# Patient Record
Sex: MALE | Race: WHITE
[De-identification: names, ages, dates, MRNs, and addresses within clinical notes are randomized per-mention and may not be internally consistent; named-entity substitution may affect disease eponyms.]

---

## 2020-01-31 ENCOUNTER — HOSPITAL ENCOUNTER (EMERGENCY)
Dept: HOSPITAL 41 - JD.ED | Age: 50
Discharge: HOME | End: 2020-01-31
Payer: COMMERCIAL

## 2020-01-31 DIAGNOSIS — I10: ICD-10-CM

## 2020-01-31 DIAGNOSIS — R53.83: Primary | ICD-10-CM

## 2020-01-31 DIAGNOSIS — Z23: ICD-10-CM

## 2020-01-31 DIAGNOSIS — Z79.899: ICD-10-CM

## 2020-01-31 PROCEDURE — G0008 ADMIN INFLUENZA VIRUS VAC: HCPCS

## 2020-01-31 NOTE — EDM.PDOC
ED HPI GENERAL MEDICAL PROBLEM





- General


Chief Complaint: Cardiovascular Problem


Stated Complaint: high blood pressure dizzy and run down


Time Seen by Provider: 01/31/20 19:26





- History of Present Illness


INITIAL COMMENTS - FREE TEXT/NARRATIVE: 





49-year-old male presents the emergency room with fatigue.





This is been an ongoing problem getting worse over the last several weeks.  

Patient is treated for hypertension and has a hip that needs to be looked at by 

orthopedics otherwise she seems to be doing all right.  The patient is very 

tired at the end of the day and just does not have any energy he is tried to 

work out a couple of times a week but at the end of his works out he is very 

short of breath and not able to do anything.  He does not have a cough he has 

no chest pain chest pressure.  He has had no leg swelling.








  ** Headache


Pain Score (Numeric/FACES): 1





- Related Data


 Allergies











Allergy/AdvReac Type Severity Reaction Status Date / Time


 


No Known Allergies Allergy   Verified 01/31/20 19:19











Home Meds: 


 Home Meds





Lisdexamfetamine Dimesylate [Vyvanse] 70 mg PO DAILY 01/31/20 [History]


Simvastatin 40 mg PO DAILY 01/31/20 [History]


amLODIPine Besylate [Amlodipine Besylate] 5 mg PO DAILY 01/31/20 [History]


buPROPion HCl [Wellbutrin Xl] 300 mg PO DAILY 01/31/20 [History]


hydrOXYzine HCL [Hydroxyzine HCl] 25 mg PO BEDTIME PRN 01/31/20 [History]


hydroCHLOROthiazide [Hydrochlorothiazide] 12.5 mg PO DAILY 01/31/20 [History]


traZODone HCl [Trazodone HCl] 50 mg PO BEDTIME PRN 01/31/20 [History]











ED ROS GENERAL





- Review of Systems


Review Of Systems: See Below


Constitutional: Reports: No Symptoms.  Denies: Fever, Chills


HEENT: Reports: No Symptoms


Respiratory: Reports: No Symptoms


Cardiovascular: Reports: No Symptoms.  Denies: Palpitations


Endocrine: Reports: Fatigue.  Denies: No Symptoms


GI/Abdominal: Reports: No Symptoms





ED EXAM, GENERAL





- Physical Exam


Exam: See Below


Exam Limited By: No Limitations


General Appearance: Alert, No Apparent Distress


Head: Atraumatic, Normocephalic


Neck: Normal Inspection, Supple, Non-Tender, Full Range of Motion.  No: 

Lymphadenopathy (L), Lymphadenopathy (R), Tender Midline, Thyromegaly


Respiratory/Chest: No Respiratory Distress, Lungs Clear, Normal Breath Sounds


Cardiovascular: Normal Peripheral Pulses, Regular Rate, Rhythm, No Edema, No 

Murmur


Neurological: Alert, Oriented, Normal Cognition





EKG INTERPRETATION


EKG Date: 01/31/20


Rhythm: NSR


Axis: Normal


P-Wave: Present


QRS: Normal


ST-T: Normal


QT: Normal


Comparison: NA - No Prior EKG





Course





- Vital Signs


Last Recorded V/S: 


 Last Vital Signs











Temp  36.7 C   01/31/20 19:14


 


Pulse  76   01/31/20 19:14


 


Resp  15   01/31/20 19:14


 


BP  128/104 H  01/31/20 19:14


 


Pulse Ox  98   01/31/20 19:14














- Orders/Labs/Meds


Orders: 


 Active Orders 24 hr











 Category Date Time Status


 


 EKG 12 Lead [EKG Documentation Completion] [RC] STAT Care  01/31/20 19:36 

Active


 


 Influenza Vaccine Charge [RC] .DISCHARGE Care  01/31/20 19:25 Active











Labs: 


 Laboratory Tests











  01/31/20 01/31/20 01/31/20 Range/Units





  19:47 19:47 19:47 


 


WBC  7.33    (4.23-9.07)  K/mm3


 


RBC  4.93    (4.63-6.08)  M/mm3


 


Hgb  14.6    (13.7-17.5)  gm/dl


 


Hct  41.6    (40.1-51.0)  %


 


MCV  84.4    (79.0-92.2)  fl


 


MCH  29.6    (25.7-32.2)  pg


 


MCHC  35.1    (32.2-35.5)  g/dl


 


RDW Std Deviation  41.3    (35.1-43.9)  fL


 


Plt Count  350 H    (163-337)  K/mm3


 


MPV  8.9 L    (9.4-12.3)  fl


 


Neut % (Auto)  51.3    (34.0-67.9)  %


 


Lymph % (Auto)  35.1    (21.8-53.1)  %


 


Mono % (Auto)  9.7    (5.3-12.2)  %


 


Eos % (Auto)  3.3    (0.8-7.0)  


 


Baso % (Auto)  0.5    (0.1-1.2)  %


 


Neut # (Auto)  3.76    (1.78-5.38)  K/mm3


 


Lymph # (Auto)  2.57    (1.32-3.57)  K/mm3


 


Mono # (Auto)  0.71    (0.30-0.82)  K/mm3


 


Eos # (Auto)  0.24    (0.04-0.54)  K/mm3


 


Baso # (Auto)  0.04    (0.01-0.08)  K/mm3


 


Sodium   139   (136-145)  mEq/L


 


Potassium   4.1   (3.5-5.1)  mEq/L


 


Chloride   102   ()  mEq/L


 


Carbon Dioxide   27   (21-32)  mEq/L


 


Anion Gap   14.1   (5-15)  


 


BUN   14   (7-18)  mg/dL


 


Creatinine   1.2   (0.7-1.3)  mg/dL


 


Est Cr Clr Drug Dosing   82.30   mL/min


 


Estimated GFR (MDRD)   > 60   (>60)  mL/min


 


BUN/Creatinine Ratio   11.7 L   (14-18)  


 


Glucose   88   ()  mg/dL


 


Calcium   9.1   (8.5-10.1)  mg/dL


 


Total Bilirubin   0.2   (0.2-1.0)  mg/dL


 


AST   5 L   (15-37)  U/L


 


ALT   26   (16-63)  U/L


 


Alkaline Phosphatase   107   ()  U/L


 


NT-Pro-B Natriuret Pep    18  (0-125)  pg/mL


 


Total Protein   7.6   (6.4-8.2)  g/dl


 


Albumin   3.8   (3.4-5.0)  g/dl


 


Globulin   3.8   gm/dL


 


Albumin/Globulin Ratio   1.0   (1-2)  


 


TSH 3rd Generation   1.739   (0.358-3.74)  uIU/mL











Meds: 


Medications














Discontinued Medications














Generic Name Dose Route Start Last Admin





  Trade Name Freq  PRN Reason Stop Dose Admin


 


Influenza Virus Vaccine  1 each  01/31/20 19:25  





  Pharmacy To Dose - Influenza Vaccine  [facility]  01/31/20 19:26  





  ONETIME ONE   





     





     





     





     


 


Influenza Virus Vaccine  60 mcg  01/31/20 19:30  01/31/20 19:38





  Fluzone Quad [id number] Syringe  [facility]  01/31/20 19:31  60 mcg





  .ONCE ONE   Administration





     





     





     





     














- Re-Assessments/Exams


Free Text/Narrative Re-Assessment/Exam: 





01/31/20 21:42


Laboratory evaluation is unrevealing EKG does not show us anything I cannot 

find any evidence to what would be causing his symptoms at this point.  He 

agrees to follow-up with his regular provider early next week it may be 

reasonable to pursue other avenues such as a sleep study or possibly a stress 

Cardiolite.





Departure





- Departure


Time of Disposition: 21:43


Disposition: Home, Self-Care 01


Clinical Impression: 


 Fatigue





Referrals: 


Marion Kiran PA-C [Primary Care Provider] - 


Forms:  ED Department Discharge


Additional Instructions: 


Return to the emergency room with any questions problems or worsening symptoms.

  





Follow-up in the clinic next week.  Discuss seeing a counselor for the stress 

you have been under possibly having a stress test for your heart and consider a 

sleep study as it does not sound like you are sleeping appropriately and this 

can definitely affect her daytime energy.





Sepsis Event Note





- Evaluation


Sepsis Screening Result: No Definite Risk





- Focused Exam


Vital Signs: 


 Vital Signs











  Temp Pulse Resp BP Pulse Ox


 


 01/31/20 19:14  36.7 C  76  15  128/104 H  98











Date Exam was Performed: 01/31/20


Time Exam was Performed: 21:42





- My Orders


Last 24 Hours: 


My Active Orders





01/31/20 19:25


Influenza Vaccine Charge [RC] .DISCHARGE 





01/31/20 19:36


EKG 12 Lead [EKG Documentation Completion] [RC] STAT 














- Assessment/Plan


Last 24 Hours: 


My Active Orders





01/31/20 19:25


Influenza Vaccine Charge [RC] .DISCHARGE 





01/31/20 19:36


EKG 12 Lead [EKG Documentation Completion] [RC] STAT